# Patient Record
Sex: FEMALE | Race: WHITE | NOT HISPANIC OR LATINO | Employment: FULL TIME | URBAN - METROPOLITAN AREA
[De-identification: names, ages, dates, MRNs, and addresses within clinical notes are randomized per-mention and may not be internally consistent; named-entity substitution may affect disease eponyms.]

---

## 2019-05-29 ENCOUNTER — CONVERSION ENCOUNTER (OUTPATIENT)
Dept: URGENT CARE | Facility: CLINIC | Age: 29
End: 2019-05-29

## 2019-06-04 VITALS
RESPIRATION RATE: 16 BRPM | HEART RATE: 70 BPM | OXYGEN SATURATION: 100 % | SYSTOLIC BLOOD PRESSURE: 120 MMHG | HEIGHT: 67 IN | DIASTOLIC BLOOD PRESSURE: 81 MMHG

## 2019-06-06 NOTE — PROGRESS NOTES
Visit Type:  Acute Visit    Chief Complaint:  URI sx.    History of Present Illness:  congestion , sore throat , runny nose  for 4 days   low grade fever   getting worse , no relief with OTC meds   concerned       Vital Signs:    Patient Profile:    29 Years Old Female  LMP:        2019  Height:     67 inches  O2 Sat:     100 %  Temp:       98.0 degrees F oral  Pulse rate: 70 / minute  Resp:       16 per minute  BP Sittin / 81  (left arm)        Problems: Active problems were reviewed with the patient during this visit.  Medications: Medications were reviewed with the patient during this visit.  Allergies: Allergies were reviewed with the patient during this visit.  No Known Allergy.    Last MP: 2019      Vitals Entered By: Johana Michael (May 29, 2019 12:11 PM)    Current Allergies (reviewed today):  No known allergies    Current Medications (including medications started today):   PENICILLIN V POTASSIUM 500 MG ORAL TABLET (PENICILLIN V POTASSIUM) 1 by mouth four times a day - every 6 hrs.      Past Medical History:     Reviewed history from 2013 and no changes required:        pt states no med or surg hx    Past Surgical History:     Reviewed history from 2012 and no changes required:        denies psh    Family History Summary:      Reviewed history Last on 01/10/2014 and no changes required:2019  First Degree Blood Relative - Has No Known Family History - Entered On: 2019    General Comments - FH:  none    Social History:     Reviewed history from 01/10/2014 and no changes required:        Patient is a former smoker.            Social History Summary:  Patient is a former smoker.  Social History Reviewed: 2019          Risk Factors:     Smoked Tobacco Use:  Former smoker    Previous Tobacco Use: Signed On - 2012  Smoked Tobacco Use:  current every day smoker     Cigarettes:  Yes -- 1/4 pack(s) per day,   Counseled to quit/cut down:  yes    Previous Alcohol  Use:     Review of Systems   General: Complains of fatigue.   Ears/Nose/Throat: Complains of nasal congestion, sore throat, dysphagia. Denies earache.   Respiratory: Complains of cough. Denies excessive sputum.   Skin: Denies rash, itching.   Heme/Lymphatic: Denies abnormal bruising, bleeding.   Allergic/Immunologic: All other systems reviewed and are negative         Physical Exam    General:      well developed, well nourished, in no acute distress.  in some discomfort   Head:      normocephalic and atraumatic.    Mouth:      Moist mucus membranes throat injected, tonsillar enlargement, white exudate and post nasal drip.    Neck:      no masses, thyromegaly, or abnormal cervical nodes.  supple   Lungs:      clear bilaterally to auscultation.  Normal resp effort   Heart:      tachy  Skin:      intact without lesions or rashes.    Psych:      alert and cooperative; normal mood and affect; normal attention span and concentration.        Blood Pressure:  Today's BP: 120/81 mm Hg            Impression & Recommendations:    Problem # 1:  STREPTOCOCCAL PHARYNGITIS (ICD-034.0) ([id number]-U04.0)  Assessment: New    The following medications were removed from the medication list:     Cipro 500 Mg Oral Tablet (Ciprofloxacin hcl) ..... Take one (1) tablet by mouth twice a day for 7d    Her updated medication list for this problem includes:     Penicillin V Potassium 500 Mg Oral Tablet (Penicillin v potassium) ..... 1 by mouth four times a day - every 6 hrs.    Orders:  Ofc Vst, New Level II ([id number])      Medications Added to Medication List This Visit:  1)  Penicillin V Potassium 500 Mg Oral Tablet (Penicillin v potassium) .... 1 by mouth four times a day - every 6 hrs.      Patient Instructions:  1)  Please complete the antibiotics.  2)  Tylenol for pain and fever.  3)  Salt water gargling tid.  4)  Change tooth brush in 3 days.  5)  Return as needed.  6)  Follow up with Primary care Provider.  7)  Follow up care is your  responsibility.  8)  Discussed at the end of the visit pertaining findings, results, treatment plan, prescriptions and follow up plan.  9)  Informed pt/caregiver to call this office with any questions or concerns. Importance of following up with PCP reiterated by nursing staff.  10)  Pt / caregiver agree to the plan and express understanding the instructions and care plan provided in the Urgent Care and the limitations of the Urgent Care       ]          Laceration/ Wound     Objective     cm  Assessment:     Plan:   Rx:   PENICILLIN V POTASSIUM 500 MG ORAL TABLET 1 by mouth four times a day - every 6 hrs..          Electronically signed by Juanito Bolden MD on 05/29/2019 at 12:36 PM  ________________________________________________________________________       Disclaimer: Converted Note message may not contain all data elements that existed in the [employer] source system. Please see [employer] System for the original note details.

## 2020-12-23 PROCEDURE — 87186 SC STD MICRODIL/AGAR DIL: CPT | Performed by: FAMILY MEDICINE

## 2020-12-23 PROCEDURE — 87086 URINE CULTURE/COLONY COUNT: CPT | Performed by: FAMILY MEDICINE

## 2020-12-23 PROCEDURE — 87088 URINE BACTERIA CULTURE: CPT | Performed by: FAMILY MEDICINE

## 2021-04-29 PROCEDURE — U0003 INFECTIOUS AGENT DETECTION BY NUCLEIC ACID (DNA OR RNA); SEVERE ACUTE RESPIRATORY SYNDROME CORONAVIRUS 2 (SARS-COV-2) (CORONAVIRUS DISEASE [COVID-19]), AMPLIFIED PROBE TECHNIQUE, MAKING USE OF HIGH THROUGHPUT TECHNOLOGIES AS DESCRIBED BY CMS-2020-01-R: HCPCS | Performed by: NURSE PRACTITIONER

## 2023-08-29 PROCEDURE — 87635 SARS-COV-2 COVID-19 AMP PRB: CPT | Performed by: FAMILY MEDICINE
